# Patient Record
Sex: MALE | Race: WHITE | NOT HISPANIC OR LATINO | Employment: FULL TIME | URBAN - METROPOLITAN AREA
[De-identification: names, ages, dates, MRNs, and addresses within clinical notes are randomized per-mention and may not be internally consistent; named-entity substitution may affect disease eponyms.]

---
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
## 2020-08-06 ENCOUNTER — TELEMEDICINE (OUTPATIENT)
Dept: FAMILY MEDICINE CLINIC | Facility: CLINIC | Age: 20
End: 2020-08-06
Payer: COMMERCIAL

## 2020-08-06 DIAGNOSIS — I89.1 LYMPHANGITIS: Primary | ICD-10-CM

## 2020-08-06 PROCEDURE — 99213 OFFICE O/P EST LOW 20 MIN: CPT | Performed by: FAMILY MEDICINE

## 2020-08-06 RX ORDER — CEPHALEXIN 500 MG/1
500 CAPSULE ORAL EVERY 8 HOURS SCHEDULED
Qty: 21 CAPSULE | Refills: 0 | Status: SHIPPED | OUTPATIENT
Start: 2020-08-06 | End: 2020-08-13

## 2020-08-06 NOTE — PROGRESS NOTES
Virtual Regular Visit      Assessment/Plan:    Problem List Items Addressed This Visit     None      Visit Diagnoses     Lymphangitis    -  Primary    Relevant Medications    cephalexin (KEFLEX) 500 mg capsule        Found to have suspected lymphangitis   - Prescribed Keflex 500 mg TID for  7 days  Advised to put a 1 to 1 mixture of baking soda and water for a few hours on site for relief  Advised to call office if rash continues to worsen or if he develops fevers    Reason for visit is   Chief Complaint   Patient presents with    Virtual Regular Visit        Encounter provider Conrado Davis MD    Provider located at [address] [place] [id number]      Recent Visits  No visits were found meeting these conditions  Showing recent visits within past 7 days and meeting all other requirements     Today's Visits  Date Type Provider Dept   08/06/20 Telemedicine Conrado Davis MD [facility]   Showing today's visits and meeting all other requirements     Future Appointments  No visits were found meeting these conditions  Showing future appointments within next 150 days and meeting all other requirements        The patient was identified by name and date of birth  Nubia Mccabe was informed that this is a telemedicine visit and that the visit is being conducted through [employer] and patient was informed that this is not a secure, HIPAA-complaint platform  He agrees to proceed     My office door was closed  No one else was in the room  He acknowledged consent and understanding of privacy and security of the video platform  The patient has agreed to participate and understands they can discontinue the visit at any time  Patient is aware this is a billable service  HPI  Nubia Mccabe is a 20 y o  male who complains of a 2 day history of rash  It is red, raised, nonitchy, painful with pressure, and located on his left thigh  Works outdoor at a [employer]  Patient reports that the rash is getting larger and more erythematous, with a red streak extending beyond the rash  Never had a rash like this before  Put some neosporin on it and a bandaid yesterday but the rash only got worse  Has had some chill since having the rash but otherwise denies fever, nausea, vomiting, or diarrhea  No past medical history on file  No past surgical history on file  Current Outpatient Medications   Medication Sig Dispense Refill    cephalexin (KEFLEX) 500 mg capsule Take 1 capsule (500 mg total) by mouth every 8 (eight) hours for 7 days 21 capsule 0    fluticasone (FLONASE) 50 mcg/act nasal spray 1 spray into each nostril daily 16 g 0     No current facility-administered medications for this visit  No Known Allergies    Review of Systems   Constitutional: Positive for chills  Negative for fatigue and fever  HENT: Positive for rhinorrhea and sneezing  Negative for ear pain, hearing loss, sore throat and tinnitus  Respiratory: Positive for cough  Cardiovascular: Negative for chest pain and palpitations  Gastrointestinal: Negative for constipation, diarrhea, nausea and vomiting  Genitourinary: Negative for difficulty urinating, dysuria and hematuria  Musculoskeletal: Negative for arthralgias, back pain and myalgias  Skin: Positive for rash  Allergic/Immunologic: Positive for environmental allergies (taking Claritin)  Neurological: Negative for dizziness, weakness, light-headedness and headaches  Psychiatric/Behavioral: Negative for confusion  The patient is not nervous/anxious  Video Exam    There were no vitals filed for this visit  Physical Exam   Constitutional: He is oriented to person, place, and time  HENT:   Head: Normocephalic and atraumatic  Abdominal: Normal appearance  Neurological: He is alert and oriented to person, place, and time     Skin: There is erythema (3 cm poorly demarcated erythemamous rash with a 4 cm streak on the anterior surface of the left thigh)  Psychiatric: Mood and thought content normal         I spent 30 minutes directly with the patient during this visit      VIRTUAL VISIT DISCLAIMER    Arielle Clark acknowledges that he has consented to an online visit or consultation  He understands that the online visit is based solely on information provided by him, and that, in the absence of a face-to-face physical evaluation by the physician, the diagnosis he receives is both limited and provisional in terms of accuracy and completeness  This is not intended to replace a full medical face-to-face evaluation by the physician  Arielle Clark understands and accepts these terms

## 2021-01-27 ENCOUNTER — HOSPITAL ENCOUNTER (EMERGENCY)
Facility: HOSPITAL | Age: 21
Discharge: HOME | End: 2021-01-27
Attending: INTERNAL MEDICINE | Admitting: INTERNAL MEDICINE
Payer: COMMERCIAL

## 2021-01-27 VITALS
TEMPERATURE: 98.1 F | RESPIRATION RATE: 18 BRPM | SYSTOLIC BLOOD PRESSURE: 124 MMHG | DIASTOLIC BLOOD PRESSURE: 73 MMHG | HEART RATE: 83 BPM | OXYGEN SATURATION: 100 %

## 2021-01-27 DIAGNOSIS — R43.0 LOSS OF SMELL: ICD-10-CM

## 2021-01-27 DIAGNOSIS — Z20.822 ENCOUNTER FOR LABORATORY TESTING FOR COVID-19 VIRUS: Primary | ICD-10-CM

## 2021-01-27 PROCEDURE — 99283 EMERGENCY DEPT VISIT LOW MDM: CPT

## 2021-01-27 PROCEDURE — 99283 EMERGENCY DEPT VISIT LOW MDM: CPT | Performed by: PHYSICIAN ASSISTANT

## 2021-01-27 PROCEDURE — U0005 INFEC AGEN DETEC AMPLI PROBE: HCPCS | Performed by: PHYSICIAN ASSISTANT

## 2021-01-27 PROCEDURE — U0003 INFECTIOUS AGENT DETECTION BY NUCLEIC ACID (DNA OR RNA); SEVERE ACUTE RESPIRATORY SYNDROME CORONAVIRUS 2 (SARS-COV-2) (CORONAVIRUS DISEASE [COVID-19]), AMPLIFIED PROBE TECHNIQUE, MAKING USE OF HIGH THROUGHPUT TECHNOLOGIES AS DESCRIBED BY CMS-2020-01-R: HCPCS | Performed by: PHYSICIAN ASSISTANT

## 2021-01-27 RX ORDER — LORATADINE 10 MG/1
10 TABLET ORAL DAILY
COMMUNITY

## 2021-01-27 NOTE — DISCHARGE INSTRUCTIONS
You were tested today for COVID-19  You must continue quarantining until test results are negative  If test is positive , you must continue isolation for 10 days since onset of symptoms, 1/23 must be fever free for 24 hours and show improvement of symptoms  We are recommending a vitamin regimen of Vitamin D3 2000 IU daily, Vitamin-C 1gram twice a day, Multivitamin daily, Zinc 220 mg daily  When you sleep you should try to lay on your stomach as much as possible, or side but avoid sleeping on back  Follow-up with your doctor for return to work note

## 2021-01-27 NOTE — Clinical Note
Ashtyn Arambula was seen and treated in our emergency department on 1/27/2021  Other - See Comments         Diagnosis:      Orsathya Nolanelise     He may return on this date: You were tested today for COVID-19  You must continue quarantining until test results are negative  If test is positive, you must continue isolation for 10 days since onset of symptoms, 1/23 must be fever free for 24 hours and show improvement of symptoms  If you have any questions or concerns, please don't hesitate to call        Owen Mandujano PA-C    ______________________________           _______________          _______________  Hospital Representative                              Date                                Time

## 2021-01-27 NOTE — ED PROVIDER NOTES
History  Chief Complaint   Patient presents with    Biological Exposure     pt reports he lost taste and smell approx 3 days ago  Poss cov+ exp at work  Denies CP, SOB, fevers, N/V/D     Pt with no PMH, no PSH,  Presents to ED for COVID testing  Reports loss of taste and smell on , 4 days ago with possible exposure to [place] + person at work, so came for testing  Patient denies fever, no sore throat, no chest pain, no shortness of breath, no abdominal pain, no NVD, no urinary symptoms, no rash, no joint pain or swelling          Prior to Admission Medications   Prescriptions Last Dose Informant Patient Reported? Taking?   fluticasone (FLONASE) 50 mcg/act nasal spray   No No   Si spray into each nostril daily   loratadine (CLARITIN) 10 mg tablet  Self Yes Yes   Sig: Take 10 mg by mouth daily      Facility-Administered Medications: None       History reviewed  No pertinent past medical history  History reviewed  No pertinent surgical history  History reviewed  No pertinent family history  I have reviewed and agree with the history as documented  E-Cigarette/Vaping    E-Cigarette Use Current Every Day User      E-Cigarette/Vaping Substances    Nicotine Yes     THC No     CBD No     Flavoring No     Other No     Unknown No      Social History     Tobacco Use    Smoking status: Current Every Day Smoker   Substance Use Topics    Alcohol use: Not Currently    Drug use: Never       Review of Systems   Constitutional: Negative for chills and fever  HENT: Negative for hearing loss, mouth sores, rhinorrhea, sore throat and trouble swallowing  Eyes: Negative for visual disturbance  Respiratory: Negative for cough and shortness of breath  Cardiovascular: Negative for chest pain  Gastrointestinal: Negative for abdominal pain, diarrhea, nausea and vomiting  Genitourinary: Negative for dysuria and frequency  Musculoskeletal: Negative for arthralgias and myalgias     Skin: Negative for color change, pallor and rash  Neurological: Negative for dizziness, weakness and headaches  Psychiatric/Behavioral: Negative for behavioral problems  All other systems reviewed and are negative  Physical Exam  Physical Exam  Vitals signs and nursing note reviewed  Constitutional:       General: He is not in acute distress  Appearance: Normal appearance  He is well-developed  He is not ill-appearing  HENT:      Head: Normocephalic and atraumatic  Right Ear: External ear normal       Left Ear: External ear normal       Nose: Nose normal       Mouth/Throat:      Mouth: Mucous membranes are moist       Pharynx: Oropharynx is clear  Eyes:      Conjunctiva/sclera: Conjunctivae normal    Neck:      Musculoskeletal: Normal range of motion  Cardiovascular:      Rate and Rhythm: Normal rate and regular rhythm  Pulmonary:      Effort: Pulmonary effort is normal  No respiratory distress  Breath sounds: Normal breath sounds  No wheezing or rhonchi  Musculoskeletal: Normal range of motion  Skin:     General: Skin is warm and dry  Capillary Refill: Capillary refill takes less than 2 seconds  Neurological:      General: No focal deficit present  Mental Status: He is alert and oriented to person, place, and time  Motor: No weakness     Psychiatric:         Behavior: Behavior normal          Vital Signs  ED Triage Vitals [01/27/21 1245]   Temperature Pulse Respirations Blood Pressure SpO2   98 1 °F (36 7 °C) 83 18 124/73 100 %      Temp Source Heart Rate Source Patient Position - Orthostatic VS BP Location FiO2 (%)   Tympanic Monitor Sitting Left arm --      Pain Score       --           Vitals:    01/27/21 1245   BP: 124/73   Pulse: 83   Patient Position - Orthostatic VS: Sitting         Visual Acuity      ED Medications  Medications - No data to display    Diagnostic Studies  Results Reviewed     Procedure Component Value Units Date/Time    Novel Coronavirus (Covid-19),PCR [facility] [[id number]] Collected: 01/27/21 1258    Lab Status: In process Specimen: Nares from Nasopharyngeal Swab Updated: 01/27/21 1300                 No orders to display              Procedures  Procedures         ED Course                             SBIRT 22yo+      Most Recent Value   SBIRT (25 yo +)   In order to provide better care to our patients, we are screening all of our patients for alcohol and drug use  Would it be okay to ask you these screening questions? Yes Filed at: 01/27/2021 1248   Initial Alcohol Screen: US AUDIT-C    1  How often do you have a drink containing alcohol?  0 Filed at: 01/27/2021 1248   2  How many drinks containing alcohol do you have on a typical day you are drinking? 0 Filed at: 01/27/2021 1248   3a  Male UNDER 65: How often do you have five or more drinks on one occasion? 0 Filed at: 01/27/2021 1248   3b  FEMALE Any Age, or MALE 65+: How often do you have 4 or more drinks on one occassion? 0 Filed at: 01/27/2021 1248   Audit-C Score  0 Filed at: 01/27/2021 1248   JIM: How many times in the past year have you    Used an illegal drug or used a prescription medication for non-medical reasons?   Never Filed at: 01/27/2021 1248                    MDM  Number of Diagnoses or Management Options  Encounter for laboratory testing for COVID-19 virus:   Loss of smell:   Diagnosis management comments: Patient well appearing vital signs stable,  discussed treatment options with patient and he was agreeable to COVID testing and discharge will follow-up with doctor as needed       Amount and/or Complexity of Data Reviewed  Clinical lab tests: ordered        Disposition  Final diagnoses:   Encounter for laboratory testing for COVID-19 virus   Loss of smell     Time reflects when diagnosis was documented in both MDM as applicable and the Disposition within this note     Time User Action Codes Description Comment    1/27/2021 12:50 PM Ainsley Ramos Add [Z20 822] Encounter for laboratory testing for COVID-19 virus     1/27/2021 12:52 PM Naren Sargentsmartha Add [R43 0] Loss of smell       ED Disposition     ED Disposition Condition Date/Time Comment    Discharge Stable Wed Jan 27, 2021 12:54 PM Misael Wolf discharge to home/self care  Follow-up Information     Follow up With Specialties Details Why Contact Info Additional Information    [employer]    [address]   [address] [phone]  [address] , [place], [place], [address]          Discharge Medication List as of 1/27/2021 12:54 PM      CONTINUE these medications which have NOT CHANGED    Details   loratadine (CLARITIN) 10 mg tablet Take 10 mg by mouth daily, Historical Med      fluticasone (FLONASE) 50 mcg/act nasal spray 1 spray into each nostril daily, Starting Wed 10/10/2018, Normal           No discharge procedures on file      PDMP Review     None          ED Provider  Electronically Signed by           Mayank Funes PA-C  01/27/21 6746

## 2021-01-28 LAB — SARS-COV-2 RNA RESP QL NAA+PROBE: POSITIVE

## 2021-07-21 ENCOUNTER — OFFICE VISIT (OUTPATIENT)
Dept: FAMILY MEDICINE CLINIC | Facility: CLINIC | Age: 21
End: 2021-07-21
Payer: COMMERCIAL

## 2021-07-21 VITALS
HEIGHT: 69 IN | BODY MASS INDEX: 19.55 KG/M2 | HEART RATE: 76 BPM | DIASTOLIC BLOOD PRESSURE: 60 MMHG | SYSTOLIC BLOOD PRESSURE: 114 MMHG | WEIGHT: 132 LBS | RESPIRATION RATE: 18 BRPM | OXYGEN SATURATION: 99 % | TEMPERATURE: 97.6 F

## 2021-07-21 DIAGNOSIS — Z23 ENCOUNTER FOR IMMUNIZATION: ICD-10-CM

## 2021-07-21 DIAGNOSIS — L92.3 TATTOO REACTION: ICD-10-CM

## 2021-07-21 DIAGNOSIS — L03.116 CELLULITIS OF LEFT LOWER EXTREMITY: Primary | ICD-10-CM

## 2021-07-21 PROCEDURE — 90715 TDAP VACCINE 7 YRS/> IM: CPT | Performed by: FAMILY MEDICINE

## 2021-07-21 PROCEDURE — 3725F SCREEN DEPRESSION PERFORMED: CPT | Performed by: FAMILY MEDICINE

## 2021-07-21 PROCEDURE — 99213 OFFICE O/P EST LOW 20 MIN: CPT | Performed by: FAMILY MEDICINE

## 2021-07-21 PROCEDURE — 3008F BODY MASS INDEX DOCD: CPT | Performed by: FAMILY MEDICINE

## 2021-07-21 PROCEDURE — 90471 IMMUNIZATION ADMIN: CPT | Performed by: FAMILY MEDICINE

## 2021-07-21 RX ORDER — CEPHALEXIN 500 MG/1
500 CAPSULE ORAL EVERY 12 HOURS SCHEDULED
Qty: 14 CAPSULE | Refills: 0 | Status: SHIPPED | OUTPATIENT
Start: 2021-07-21 | End: 2021-07-28

## 2021-07-21 NOTE — LETTER
July 21, 2021     Patient: Jacki Price   YOB: 2000   Date of Visit: 7/21/2021       To Whom it May Concern:    Jacki Price is under my professional care  He was seen in my office on 7/21/2021  He may return to work on 7/23/21  Please also excuse from work for 7/19 and 7/20  If you have any questions or concerns, please don't hesitate to call           Sincerely,          Rishi Cobos MD        CC: No Recipients

## 2021-07-21 NOTE — PROGRESS NOTES
Assessment/Plan:     Diagnoses and all orders for this visit:    Cellulitis of left lower extremity  -     cephalexin (KEFLEX) 500 mg capsule; Take 1 capsule (500 mg total) by mouth every 12 (twelve) hours for 7 days  -     CBC and differential; Future  -     CBC and differential    Encounter for immunization  -     TDAP VACCINE GREATER THAN OR EQUAL TO 6YO IM    Tattoo reaction  -     cephalexin (KEFLEX) 500 mg capsule; Take 1 capsule (500 mg total) by mouth every 12 (twelve) hours for 7 days  -     CBC and differential; Future  -     CBC and differential      Picture of Tattoo attached below  No hx of MRSA or allergy to ABX  Keflex 500mg BID x7 days prescribed  Advised to follow up immediately if symptoms worsening or not improving  Go straight to ED if fever or malaise develop  Subjective:      Patient ID: Rasheeda West is a 23 y o  male  HPI  Patient is a 24 yo male with no significant PMH who presents due to concern for infection of his recent tattoo  Patient has multiple tattoos over body but this was the first tattoo he gave himself as he is practicing on being a   Landeros Ali is located over left thigh and consists of a red balloon with the word "IT" based off the movie  He states the red balloon is warm to touch and he has been cleaning/ wiping it each day with rubbing alcohol and yesterday evening had some pus draining as well  Denies any fever, chills, shortness of breath, lethargy or general malaise  States he used clean sterilized equipment prior to giving himself the tattoo  No hx of MRSA he reports or allergies to antibiotics        The following portions of the patient's history were reviewed and updated as appropriate: allergies, current medications, past medical history, past social history, past surgical history and problem list     Review of Systems    Per HPI    Objective:      /68   Pulse 76   Temp 97 6 °F (36 4 °C) (Tympanic)   Resp 18   Ht 5' 9" (1 753 m)   Wt 59 9 kg (132 lb)   SpO2 99%   BMI 19 49 kg/m²          Physical Exam  Constitutional:       General: He is not in acute distress  Appearance: Normal appearance  He is not ill-appearing or toxic-appearing  Cardiovascular:      Rate and Rhythm: Normal rate and regular rhythm  Pulses: Normal pulses  Heart sounds: Normal heart sounds  Pulmonary:      Effort: Pulmonary effort is normal       Breath sounds: Normal breath sounds  Musculoskeletal:      Right lower leg: No edema  Left lower leg: No edema  Skin:     General: Skin is warm  Capillary Refill: Capillary refill takes less than 2 seconds  Comments: Erythema difficult to bi over tattoo as it is red in color  Warm to touch, no purulence observed but small amount of clear discharge overlying  No streaking or warmth outside of area of tattoo  Picture of tattoo attached below   Neurological:      General: No focal deficit present  Mental Status: He is alert and oriented to person, place, and time  Psychiatric:         Mood and Affect: Mood normal          Behavior: Behavior normal            Media Information     Document Information    Clinical Image - Mobile Device   Left thigh IT tattoo   07/21/2021 11:50 AM   Attached To:    Office Visit on 7/21/21 with Kaylin Mitchell MD   Source Information    Joséu King MD  [facility]

## 2022-10-10 ENCOUNTER — OFFICE VISIT (OUTPATIENT)
Dept: FAMILY MEDICINE CLINIC | Facility: CLINIC | Age: 22
End: 2022-10-10
Payer: COMMERCIAL

## 2022-10-10 DIAGNOSIS — J30.2 SEASONAL ALLERGIES: Primary | ICD-10-CM

## 2022-10-10 PROCEDURE — 99213 OFFICE O/P EST LOW 20 MIN: CPT | Performed by: FAMILY MEDICINE

## 2022-10-10 RX ORDER — MONTELUKAST SODIUM 10 MG/1
10 TABLET ORAL
Qty: 30 TABLET | Refills: 3 | Status: SHIPPED | OUTPATIENT
Start: 2022-10-10 | End: 2022-10-10 | Stop reason: CLARIF

## 2022-10-10 RX ORDER — MONTELUKAST SODIUM 10 MG/1
10 TABLET ORAL
Qty: 30 TABLET | Refills: 1 | Status: SHIPPED | OUTPATIENT
Start: 2022-10-10

## 2022-10-10 NOTE — ASSESSMENT & PLAN NOTE
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

## 2022-10-10 NOTE — LETTER
October 10, 2022     Patient: Matthew Riley  YOB: 2000  Date of Visit: 10/10/2022      To Whom it May Concern:    Matthew Riley is under my professional care  Monique Osuna was seen in my office on 10/10/2022  Monique Osuan may return to work on 10/11/22  If you have any questions or concerns, please don't hesitate to call           Sincerely,          Ligia Yarbrough MD        CC: No Recipients

## 2022-10-10 NOTE — PROGRESS NOTES
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 states that his symptoms usually begins when he enters work  -Counseled on avoiding dogs/cats, carpets, dust as best as he can   -Given Saline wash sample from office  -Begin using Montelukast 10 mg daily   -Refer to allergist if this does not help     Orders:  -     montelukast (SINGULAIR) 10 mg tablet; Take 1 tablet (10 mg total) by mouth daily at bedtime        Return in about 4 weeks (around 11/7/2022) for Follow-up of allergy symptoms  Subjective:   HPI  General Pall is a 24 y o  male for follow-up of his allergy symptoms  Review of Systems   Constitutional: Negative for chills and fever  HENT: Negative for ear pain and sore throat  Eyes: Negative for pain and visual disturbance  Respiratory: Negative for cough and shortness of breath  Cardiovascular: Negative for chest pain and palpitations  Gastrointestinal: Negative for abdominal pain and vomiting  Genitourinary: Negative for dysuria and hematuria  Musculoskeletal: Negative for arthralgias and back pain  Skin: Negative for color change and rash  Neurological: Negative for seizures and syncope  All other systems reviewed and are negative  Objective: There were no vitals taken for this visit  Physical Exam  Constitutional:       Appearance: Normal appearance  HENT:      Nose: Congestion present  Mouth/Throat:      Mouth: Mucous membranes are moist       Pharynx: Oropharynx is clear  Eyes:      General:         Right eye: No discharge  Left eye: No discharge  Conjunctiva/sclera: Conjunctivae normal       Comments: Allergic shiners present   Cardiovascular:      Rate and Rhythm: Normal rate and regular rhythm  Pulmonary:      Effort: Pulmonary effort is normal       Breath sounds: Normal breath sounds  Abdominal:      Palpations: Abdomen is soft  Tenderness: There is no abdominal tenderness  Musculoskeletal:      Cervical back: Neck supple  Skin:     General: Skin is warm and dry  Capillary Refill: Capillary refill takes less than 2 seconds  Neurological:      Mental Status: He is alert            ** Please Note: This note has been constructed using a voice recognition system **     Bao Morrell  10/13/22  2:57 PM

## 2023-02-15 ENCOUNTER — APPOINTMENT (OUTPATIENT)
Dept: URGENT CARE | Facility: MEDICAL CENTER | Age: 23
End: 2023-02-15

## 2023-02-19 ENCOUNTER — APPOINTMENT (OUTPATIENT)
Dept: URGENT CARE | Facility: MEDICAL CENTER | Age: 23
End: 2023-02-19